# Patient Record
Sex: MALE | Race: WHITE
[De-identification: names, ages, dates, MRNs, and addresses within clinical notes are randomized per-mention and may not be internally consistent; named-entity substitution may affect disease eponyms.]

---

## 2017-07-28 NOTE — ERA
ER Documentation


Chief Complaint


Date/Time


DATE: 7/28/17 


TIME: 14:34


Chief Complaint


POSSIBLE ALLERGIC REACTION TO R HAND





HPI


This is an otherwise healthy 31-year-old male presenting with a chief complaint 

of rash 2 weeks.  Patient states that the rash is pruritic and on his hands 

have come and gone.  Patient has tried Clotrimazole with no relief.  Patient 

also has had a chronic rash on his ankles and under the armpits.  Denies fever, 

rapid progression of symptoms, recent antibiotic use, symptomatic close contacts

, diabetes, pain, history of skin opening or by. Patients vaccination status is 

up to date.  No recent travel.  Nursing notes have been reviewed and are 

consistent with history given.





ROS


All systems reviewed and are negative except as per history of present illness.





Medications


Home Meds


Active Scripts


Hydrocortisone* Topical (Hydrocortisone* Topical) 2.5%-28.3 Gm Cream..g., 1 

APPLIC TOP BID, #1 TUB


   Prov:LOW BALLESTEROS PA-C         7/28/17


Prednisone* (Prednisone*) 20 Mg Tab, 40 MG PO DAILY for 4 Days, TAB


   Prov:LOW BALLESTEROS PA-C         7/28/17





PMhx/Soc


History of Surgery:  No


Anesthesia Reaction:  No


Hx Neurological Disorder:  No


Hx Respiratory Disorders:  No


Hx Cardiac Disorders:  No


Hx Psychiatric Problems:  No


Hx Miscellaneous Medical Probl:  No


Hx Alcohol Use:  No


Hx Substance Use:  No


Hx Tobacco Use:  No


Smoking Status:  Never smoker





Physical Exam


Vitals





Vital Signs








  Date Time  Temp Pulse Resp B/P Pulse Ox O2 Delivery O2 Flow Rate FiO2


 


7/28/17 14:02 98.0 80 18 141/77 0   








Physical Exam


Const:          Healthy-appearing. Well-nourished. Well-developed. No acute 

distress.


Skin:          Pink wheals on the hands bilaterally with mild swelling.  

Confide rash on the posterior ankles around the Achilles tendon bilaterally 

most consistent with psoriasis.


Ext:          No palpable cord. Normal movement of all extremities grossly 

observed.


Neur:          Awake, alert and oriented x3. Neurovascularly intact 

bilaterally. 


Oral:          No oral edema visualized.  Mucous membranes moist and pink.


Head:          Normocephalic, Atraumatic. 


Eyes:          Non-injected; No discharge. EOMI and AUDREY bilaterally.


Ears:          Normal External Ears, EACs clear, TM normal bilaterally without 

erythema. 


Nose:          Normal external nose; no discharge, or sinus tenderness.  


Neck:          No cervical lymphadenopathy, masses or goiter palpated. ~ No 

meningismus.


Pulm:          Good air movement in upper and lower respiratory tracts. No 

dyspnea, stridor, tripoding or drooling. Clear to auscultation bilaterally.


Cardio:          Regular rate and rhythm; No murmurs, gallops or rubs 

auscultated. No JVD grossly observed. No cyanosis. Capillary refill less than 2 

seconds. 


Abd:          Soft, non tender, non distended. No guarding, masses. Normal 

bowel sounds.


MS:          Normal motor strength, normal tone with gross examination.


Back:          No midline, flank or CVA tenderness.


Psych:          Normal Mood and Affect.





Procedures/MDM


31-year-old otherwise healthy male presenting with a chief complaint of rash 2 

weeks.  Patient has an unknown trigger but symptoms and signs are most 

consistent with allergic reaction versus contact dermatitis.  Patient also has 

another chronic rash on the ankles is most consistent with psoriasis.  At this 

time of little suspicion for fungal or bacterial involvement as the patient has 

tried clotrimazole without improvement.  I also have little suspicion for 

endangerment of the airway or cardiac involvement.  Will go ahead and prescribe 

the patient oral prednisone 4 days and topical hydrocortisone.  I have spoke 

with the patient regarding their condition and future management. They have 

verbally responded that they understand their status and treatment plan. The 

patients vitals are stable, and their current condition is appropriate for 

discharge. The patient will be given discharge instructions with return 

precautions.





Departure


Diagnosis:  


 Primary Impression:  


 Allergic reaction


 Qualified Code:  T78.40XA - Allergic reaction, initial encounter


 Additional Impression:  


 Contact dermatitis


 Qualified Code:  L25.9 - Contact dermatitis, unspecified contact dermatitis 

type, unspecified trigger


Condition:  Stable


Patient Instructions:  Contact Dermatitis





Additional Instructions:  


Enrique un seguimiento con [facility] dentro de los prximos 1-3 neal para anya evaluaci

n ms completa y anya posible derivacin a un especialista. Devuelva el 

departamento de emergencia inmediatamente si los sntomas empeoran o cambian. 

Si tiene alguna pregunta con respecto a los medicamentos, consulte con [facility]

utico o con nosotros antes de salir. Si se producen reacciones adversas 

mientras [place] [facility] medicamentos, suspenda el tratamiento y regrese 

inmediatamente al servicio de urgencias. [place] medicamentos segn las 

indicaciones y complete el curso completo del tratamiento.











LOW BALLESTEROS PA-C Jul 28, 2017 14:39

## 2018-06-03 ENCOUNTER — HOSPITAL ENCOUNTER (EMERGENCY)
Age: 33
Discharge: HOME | End: 2018-06-03

## 2018-06-03 ENCOUNTER — HOSPITAL ENCOUNTER (EMERGENCY)
Dept: HOSPITAL 91 - FTE | Age: 33
Discharge: HOME | End: 2018-06-03
Payer: MEDICAID

## 2018-06-03 DIAGNOSIS — K04.7: Primary | ICD-10-CM

## 2018-06-03 PROCEDURE — 41800 DRAINAGE OF GUM LESION: CPT

## 2018-06-03 PROCEDURE — 99284 EMERGENCY DEPT VISIT MOD MDM: CPT

## 2018-06-03 PROCEDURE — 96372 THER/PROPH/DIAG INJ SC/IM: CPT

## 2018-06-03 RX ADMIN — LIDOCAINE HYDROCHLORIDE 1 ML: 10 INJECTION, SOLUTION INFILTRATION; PERINEURAL at 17:24

## 2018-06-03 RX ADMIN — KETOROLAC TROMETHAMINE 1 MG: 30 INJECTION, SOLUTION INTRAMUSCULAR at 17:23

## 2018-06-03 RX ADMIN — HYDROCODONE BITARTRATE AND ACETAMINOPHEN 1 TAB: 5; 325 TABLET ORAL at 17:12

## 2018-10-05 ENCOUNTER — HOSPITAL ENCOUNTER (EMERGENCY)
Dept: HOSPITAL 91 - FTE | Age: 33
Discharge: HOME | End: 2018-10-05
Payer: SELF-PAY

## 2018-10-05 ENCOUNTER — HOSPITAL ENCOUNTER (EMERGENCY)
Age: 33
Discharge: HOME | End: 2018-10-05

## 2018-10-05 DIAGNOSIS — S82.892A: Primary | ICD-10-CM

## 2018-10-05 DIAGNOSIS — S90.511A: ICD-10-CM
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
## 2018-10-05 DIAGNOSIS — S90.01XA: ICD-10-CM

## 2018-10-05 DIAGNOSIS — S62.102A: ICD-10-CM
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
## 2018-10-05 PROCEDURE — 99283 EMERGENCY DEPT VISIT LOW MDM: CPT

## 2018-10-05 PROCEDURE — 73610 X-RAY EXAM OF ANKLE: CPT

## 2018-10-05 RX ADMIN — HYDROCODONE BITARTRATE AND ACETAMINOPHEN 1 TAB: 5; 325 TABLET ORAL at 13:09

## 2019-02-16 ENCOUNTER — HOSPITAL ENCOUNTER (INPATIENT)
Dept: HOSPITAL 10 - FTE | Age: 34
LOS: 1 days | Discharge: LEFT BEFORE DISCHARGE | DRG: 540 | End: 2019-02-17
Payer: MEDICAID

## 2019-02-16 ENCOUNTER — HOSPITAL ENCOUNTER (INPATIENT)
Dept: HOSPITAL 91 - FTE | Age: 34
LOS: 1 days | Discharge: LEFT BEFORE DISCHARGE | DRG: 540 | End: 2019-02-17
Payer: MEDICAID

## 2019-02-16 VITALS
HEIGHT: 69 IN | HEIGHT: 69 IN | BODY MASS INDEX: 29.32 KG/M2 | WEIGHT: 197.98 LBS | WEIGHT: 197.98 LBS | BODY MASS INDEX: 29.32 KG/M2

## 2019-02-16 VITALS — RESPIRATION RATE: 18 BRPM | DIASTOLIC BLOOD PRESSURE: 84 MMHG | HEART RATE: 85 BPM | SYSTOLIC BLOOD PRESSURE: 134 MMHG

## 2019-02-16 VITALS — HEART RATE: 89 BPM | SYSTOLIC BLOOD PRESSURE: 130 MMHG | RESPIRATION RATE: 16 BRPM | DIASTOLIC BLOOD PRESSURE: 68 MMHG

## 2019-02-16 VITALS — HEART RATE: 101 BPM | SYSTOLIC BLOOD PRESSURE: 130 MMHG | DIASTOLIC BLOOD PRESSURE: 77 MMHG | RESPIRATION RATE: 18 BRPM

## 2019-02-16 DIAGNOSIS — M86.8X3: Primary | ICD-10-CM

## 2019-02-16 DIAGNOSIS — L03.114: ICD-10-CM

## 2019-02-16 DIAGNOSIS — M25.422: ICD-10-CM

## 2019-02-16 LAB
ADD MAN DIFF?: NO
ALANINE AMINOTRANSFERASE: 26 IU/L (ref 13–69)
ALBUMIN/GLOBULIN RATIO: 1.11
ALBUMIN: 5 G/DL (ref 3.3–4.9)
ALKALINE PHOSPHATASE: 105 IU/L (ref 42–121)
ANION GAP: 16 (ref 5–13)
ASPARTATE AMINO TRANSFERASE: 31 IU/L (ref 15–46)
BASOPHIL #: 0 10^3/UL (ref 0–0.1)
BASOPHILS %: 0.4 % (ref 0–2)
BILIRUBIN,DIRECT: 0 MG/DL (ref 0–0.2)
BILIRUBIN,TOTAL: 0.3 MG/DL (ref 0.2–1.3)
BLOOD UREA NITROGEN: 13 MG/DL (ref 7–20)
C-REACTIVE PROTEIN: < 0.5 MG/DL (ref 0–0.9)
CALCIUM: 10 MG/DL (ref 8.4–10.2)
CARBON DIOXIDE: 28 MMOL/L (ref 21–31)
CHLORIDE: 98 MMOL/L (ref 97–110)
CREATININE: 0.89 MG/DL (ref 0.61–1.24)
EOSINOPHILS #: 0.1 10^3/UL (ref 0–0.5)
EOSINOPHILS %: 0.8 % (ref 0–7)
ERYTHROCYTE SEDIMENTATION RATE: 15 MM/HR (ref 0–15)
GLOBULIN: 4.5 G/DL (ref 1.3–3.2)
GLUCOSE: 94 MG/DL (ref 70–220)
HEMATOCRIT: 49.5 % (ref 42–52)
HEMOGLOBIN: 16.7 G/DL (ref 14–18)
IMMATURE GRANS #M: 0.04 10^3/UL (ref 0–0.03)
IMMATURE GRANS % (M): 0.4 % (ref 0–0.43)
LYMPHOCYTES #: 3.8 10^3/UL (ref 0.8–2.9)
LYMPHOCYTES %: 41.1 % (ref 15–51)
MEAN CORPUSCULAR HEMOGLOBIN: 28.4 PG (ref 29–33)
MEAN CORPUSCULAR HGB CONC: 33.7 G/DL (ref 32–37)
MEAN CORPUSCULAR VOLUME: 84.3 FL (ref 82–101)
MEAN PLATELET VOLUME: 10.7 FL (ref 7.4–10.4)
MONOCYTE #: 0.8 10^3/UL (ref 0.3–0.9)
MONOCYTES %: 8.8 % (ref 0–11)
NEUTROPHIL #: 4.5 10^3/UL (ref 1.6–7.5)
NEUTROPHILS %: 48.5 % (ref 39–77)
NUCLEATED RED BLOOD CELLS #: 0 10^3/UL (ref 0–0)
NUCLEATED RED BLOOD CELLS%: 0 /100WBC (ref 0–0)
PLATELET COUNT: 293 10^3/UL (ref 140–415)
POTASSIUM: 4.1 MMOL/L (ref 3.5–5.1)
RED BLOOD COUNT: 5.87 10^6/UL (ref 4.7–6.1)
RED CELL DISTRIBUTION WIDTH: 13.5 % (ref 11.5–14.5)
SODIUM: 142 MMOL/L (ref 135–144)
TOTAL PROTEIN: 9.5 G/DL (ref 6.1–8.1)
WHITE BLOOD COUNT: 9.2 10^3/UL (ref 4.8–10.8)

## 2019-02-16 PROCEDURE — 83605 ASSAY OF LACTIC ACID: CPT

## 2019-02-16 PROCEDURE — 93005 ELECTROCARDIOGRAM TRACING: CPT

## 2019-02-16 PROCEDURE — 87040 BLOOD CULTURE FOR BACTERIA: CPT

## 2019-02-16 PROCEDURE — 83036 HEMOGLOBIN GLYCOSYLATED A1C: CPT

## 2019-02-16 PROCEDURE — 96365 THER/PROPH/DIAG IV INF INIT: CPT

## 2019-02-16 PROCEDURE — 80202 ASSAY OF VANCOMYCIN: CPT

## 2019-02-16 PROCEDURE — 87070 CULTURE OTHR SPECIMN AEROBIC: CPT

## 2019-02-16 PROCEDURE — 86140 C-REACTIVE PROTEIN: CPT

## 2019-02-16 PROCEDURE — 84145 PROCALCITONIN (PCT): CPT

## 2019-02-16 PROCEDURE — 73221 MRI JOINT UPR EXTREM W/O DYE: CPT

## 2019-02-16 PROCEDURE — 36415 COLL VENOUS BLD VENIPUNCTURE: CPT

## 2019-02-16 PROCEDURE — 96375 TX/PRO/DX INJ NEW DRUG ADDON: CPT

## 2019-02-16 PROCEDURE — 84443 ASSAY THYROID STIM HORMONE: CPT

## 2019-02-16 PROCEDURE — 85025 COMPLETE CBC W/AUTO DIFF WBC: CPT

## 2019-02-16 PROCEDURE — 80053 COMPREHEN METABOLIC PANEL: CPT

## 2019-02-16 PROCEDURE — 73200 CT UPPER EXTREMITY W/O DYE: CPT

## 2019-02-16 PROCEDURE — 73090 X-RAY EXAM OF FOREARM: CPT

## 2019-02-16 PROCEDURE — 85651 RBC SED RATE NONAUTOMATED: CPT

## 2019-02-16 PROCEDURE — 96367 TX/PROPH/DG ADDL SEQ IV INF: CPT

## 2019-02-16 PROCEDURE — 99285 EMERGENCY DEPT VISIT HI MDM: CPT

## 2019-02-16 RX ADMIN — THIAMINE HYDROCHLORIDE 1 MLS/HR: 100 INJECTION, SOLUTION INTRAMUSCULAR; INTRAVENOUS at 13:57

## 2019-02-16 RX ADMIN — VANCOMYCIN HYDROCHLORIDE 1 MLS/HR: 1 INJECTION, POWDER, LYOPHILIZED, FOR SOLUTION INTRAVENOUS at 14:29

## 2019-02-16 RX ADMIN — VANCOMYCIN HYDROCHLORIDE 1 MLS/HR: 1 INJECTION, POWDER, LYOPHILIZED, FOR SOLUTION INTRAVENOUS at 22:20

## 2019-02-16 RX ADMIN — PIPERACILLIN SODIUM AND TAZOBACTAM SODIUM 1 MLS/HR: 3; .375 INJECTION, POWDER, LYOPHILIZED, FOR SOLUTION INTRAVENOUS at 20:50

## 2019-02-16 RX ADMIN — FOLIC ACID SCH MLS/HR: 5 INJECTION, SOLUTION INTRAMUSCULAR; INTRAVENOUS; SUBCUTANEOUS at 18:39

## 2019-02-16 RX ADMIN — PIPERACILLIN SODIUM AND TAZOBACTAM SODIUM SCH MLS/HR: 3; .375 INJECTION, POWDER, LYOPHILIZED, FOR SOLUTION INTRAVENOUS at 23:36

## 2019-02-16 RX ADMIN — ONDANSETRON HYDROCHLORIDE 1 MG: 2 INJECTION, SOLUTION INTRAMUSCULAR; INTRAVENOUS at 15:16

## 2019-02-16 RX ADMIN — LIDOCAINE HYDROCHLORIDE 1 ML: 10 INJECTION, SOLUTION EPIDURAL; INFILTRATION; INTRACAUDAL; PERINEURAL at 10:27

## 2019-02-16 RX ADMIN — PIPERACILLIN SODIUM AND TAZOBACTAM SODIUM 1 MLS/HR: 3; .375 INJECTION, POWDER, LYOPHILIZED, FOR SOLUTION INTRAVENOUS at 23:36

## 2019-02-16 RX ADMIN — PIPERACILLIN SODIUM AND TAZOBACTAM SODIUM SCH MLS/HR: 3; .375 INJECTION, POWDER, LYOPHILIZED, FOR SOLUTION INTRAVENOUS at 20:50

## 2019-02-16 RX ADMIN — VANCOMYCIN HYDROCHLORIDE SCH MLS/HR: 1 INJECTION, POWDER, LYOPHILIZED, FOR SOLUTION INTRAVENOUS at 22:20

## 2019-02-16 RX ADMIN — CEFTRIAXONE 1 MLS/HR: 1 INJECTION, SOLUTION INTRAVENOUS at 13:56

## 2019-02-16 RX ADMIN — THIAMINE HYDROCHLORIDE 1 MLS/HR: 100 INJECTION, SOLUTION INTRAMUSCULAR; INTRAVENOUS at 18:39

## 2019-02-16 NOTE — ERD
ER Documentation


Chief Complaint


Chief Complaint





lt wrist pain





HPI


33-year-old male presents for left forearm pain times 3 weeks.  Patient states 


that he had a fall few weeks ago when he had left wrist and left ankle fracture 


which was casted.  He subsequently developed left forearm swelling which was 


thought to be due to use of crutches.  He however states that the swelling has 


not gone away.  The left forearm is developing some erythema.  Denies any fevers


or chills.  He states he has 6 out of 10 pain.





ROS


All systems reviewed and are negative except as per history of present illness.





Medications


Home Meds


Active Scripts


Ibuprofen* (Motrin*) 600 Mg Tab, 600 MG PO Q6, #30 TAB


   Prov:DILLON SHARMA PA-C         10/5/18


Naproxen* (Naprosyn*) 500 Mg Tablet, 500 MG PO BID PRN for PAIN AND/OR 


INFLAMMATION, #30 TAB


   Prov:MURALI ROUSSEAU PA-C         6/3/18


Hydrocodone/Acetaminophen (Norco 5-325 Tablet) 1 Each Tablet, 1 TAB PO Q6H PRN 


for PAIN, #7 TAB


   Prov:MURALI ROUSSEAU PA-C         6/3/18


Amoxicillin/Potassium Clav (Amox-Clav 875-125 mg Tablet) 875-125 mg Tab, 1 TAB 


PO BID for 10 Days, #20 TAB


   Prov:MURALI ROUSSEAU PA-C         6/3/18


Hydrocortisone* Topical (Hydrocortisone* Topical) 2.5%-28.3 Gm Cream..g., 1 


APPLIC TOP BID, #1 TUB


   Prov:OLW BALLESTEROS PA-C         7/28/17


Prednisone* (Prednisone*) 20 Mg Tab, 40 MG PO DAILY for 4 Days, TAB


   Prov:LOW BALLESTEROS PA-C         7/28/17





Allergies


Allergies:  


Coded Allergies:  


     No Known Allergy (Unverified , 2/16/19)





PMhx/Soc


Medical and Surgical Hx:  pt denies Medical Hx, pt denies Surgical Hx


History of Surgery:  No


Anesthesia Reaction:  No


Hx Neurological Disorder:  No


Hx Respiratory Disorders:  No


Hx Cardiac Disorders:  No


Hx Psychiatric Problems:  No


Hx Miscellaneous Medical Probl:  No


Hx Alcohol Use:  Yes (occassional)


Hx Substance Use:  No


Hx Tobacco Use:  No


Smoking Status:  Former smoker





Physical Exam


Vitals





Vital Signs


  Date      Temp  Pulse  Resp  B/P (MAP)   Pulse Ox  O2          O2 Flow    FiO2


Time                                                 Delivery    Rate


   2/16/19  98.3     83    18      167/96        98


     09:31                          (119)





Physical Exam


Const:   No acute distress


Neck:               Full range of motion. No meningismus.


Resp:   Clear to auscultation bilaterally


Cardio:   Regular rate and rhythm, no murmurs, cap refills less than 2 seconds 


in all fingers of left hand


Abd:    Soft, non tender, non distended. Normal bowel sounds


Skin:   No petechiae or rashes


Back:   No midline or flank tenderness


Ext:    Left forearm mass about 2cm with erythema and underlying fluctuance 


Neur:   Awake and alert, lateral upper extremity sensation intact, left hand 


sensation intact


Psych:    Normal Mood and Affect


Result Diagram:  


2/16/19 1334                                                                    


           2/16/19 1334





Results 24 hrs





Laboratory Tests


              Test
                                 2/16/19
13:34


              White Blood Count                      9.2 10^3/ul


              Red Blood Count                       5.87 10^6/ul


              Hemoglobin                               16.7 g/dl


              Hematocrit                                  49.5 %


              Mean Corpuscular Volume                    84.3 fl


              Mean Corpuscular Hemoglobin                28.4 pg


              Mean Corpuscular Hemoglobin
Concent     33.7 g/dl 



              Red Cell Distribution Width                 13.5 %


              Platelet Count                         293 10^3/UL


              Mean Platelet Volume                       10.7 fl


              Immature Granulocytes %                    0.400 %


              Neutrophils %                               48.5 %


              Lymphocytes %                               41.1 %


              Monocytes %                                  8.8 %


              Eosinophils %                                0.8 %


              Basophils %                                  0.4 %


              Nucleated Red Blood Cells %            0.0 /100WBC


              Immature Granulocytes #              0.040 10^3/ul


              Neutrophils #                          4.5 10^3/ul


              Lymphocytes #                          3.8 10^3/ul


              Monocytes #                            0.8 10^3/ul


              Eosinophils #                          0.1 10^3/ul


              Basophils #                            0.0 10^3/ul


              Nucleated Red Blood Cells #            0.0 10^3/ul


              Sodium Level                            142 mmol/L


              Potassium Level                         4.1 mmol/L


              Chloride Level                           98 mmol/L


              Carbon Dioxide Level                     28 mmol/L


              Anion Gap                                       16


              Blood Urea Nitrogen                       13 mg/dl


              Creatinine                              0.89 mg/dl


              Est Glomerular Filtrat Rate
mL/min   > 60 mL/min 



              Glucose Level                             94 mg/dl


              Calcium Level                           10.0 mg/dl


              Total Bilirubin                          0.3 mg/dl


              Direct Bilirubin                        0.00 mg/dl


              Indirect Bilirubin                       0.3 mg/dl


              Aspartate Amino Transf
(AST/SGOT)         31 IU/L 



              Alanine Aminotransferase
(ALT/SGPT)       26 IU/L 



              Alkaline Phosphatase                      105 IU/L


              C-Reactive Protein                   < 0.5 mg/dl


              Total Protein                             9.5 g/dl


              Albumin                                   5.0 g/dl


              Globulin                                 4.50 g/dl


              Albumin/Globulin Ratio                        1.11





Current Medications


 Medications
   Dose
          Sig/Marcell
       Start Time
   Status  Last


 (Trade)       Ordered        Route
 PRN     Stop Time              Admin
Dose


                              Reason                                Admin


 Lidocaine
     5 ml           ONCE  ONCE
    2/16/19       DC       



(Xylocaine                    INFIL
         10:30



1%
  (Mpf))                                  2/16/19 10:31


 Ceftriaxone    50 ml @ 
      ONCE  ONCE
    2/16/19       DC           2/16/19


Sodium         100 mls/hr     IVPB
          13:30
                       13:56



                                             2/16/19 13:59


 Vancomycin     250 ml @ 
     ONCE  ONCE
    2/16/19       DC           2/16/19


HCl            125 mls/hr     IVPB
          13:30
                       14:29



                                             2/16/19 15:29


 Sodium         1,000 ml @ 
   Q1H ONCE
      2/16/19       DC           2/16/19


Chloride       1,000 mls/hr   IV
            13:30
                       13:57



                                             2/16/19 14:29


 Morphine       4 mg           ONCE  STAT
    2/16/19       DC           2/16/19


Sulfate
                      IV
            14:40
                       15:17



(morphine)                                   2/16/19 14:41


 Ondansetron    4 mg           ONCE  STAT
    2/16/19       DC           2/16/19


HCl
  (Zofran                 IV
            14:40
                       15:16



Inj)                                         2/16/19 14:41


 Ondansetron    4 mg           BRIDGE ORDER   2/16/19                



HCl
  (Zofran                 PRN
 IV
       15:30



Inj)                          NAUSEA/VOMITI  2/17/19 15:29


                              NG


                650 mg         ER BRIDGE      2/16/19                



Acetaminophen                 PRN
 PO
       15:30




  (Tylenol                   .MILD PAIN     2/17/19 15:29


Tab)                          1-3 OR TEMP








Procedures/MDM


Abscess Incision and Drainage with irrigation by me:


Location:      Left forearm


Anesthesia:                               Local 1% Lidocaine


Technique:      Irrigated. Disrupted loculations w/ instrumentation


Packing:      None


Complications:      Neurovascularly intact post procedure








Medical Decision Making:





Differential diagnosis includes but not limited to left forearm abscess, 


osteomyelitis,contact dermatitis





Patient appeared well on physical exam.


Left forearm examination consistent with an abscess.


Due to the size of the infection x-ray was ordered.





The left forearm x-ray showed possibility of a bone infection versus abscess 


versus tumor


CT of the forearm showed chronic osteomyelitis with radiocarpal joint effusion





CBC:      no e/o of systemic infection or severe anemia


CMP:      no e/o severe acidosis, alkalosis, renal failure, diabetic 


ketoacidosis, liver disease





Blood cultures were ordered





The abscess was incised and drained, see procedure note above.  Wound culture of


the abscess was sent to lab.





Patient was started on IV fluids, pain medication and IV Rocephin and vancomycin


for the osteomyelitis.





Patient did not meet SIRS criteria, low suspicion for sepsis.





Given need for IV antibiotics for osteomyelitis, contacted on call hospitalist 


team. 


Dr. Huber agreed to admit patient for further management.








Disclaimer: Inadvertent spelling and grammatical errors are likely due to 


EHR/dictation software use and do not reflect on the overall quality of patient 


care. Also, please note that the electronic time recorded on this note does not 


necessarily reflect the actual time of the patient encounter.





Departure


Diagnosis:  


   Primary Impression:  


   Osteomyelitis


   Osteomyelitis type:  unspecified type  Osteomyelitis location:  radius  


   Laterality:  left  Qualified Codes:  M86.9 - Osteomyelitis, unspecified


   Additional Impression:  


   Skin abscess


   Site of cutaneous abscess:  extremity  Site of cutaneous abscess of 


   extremity:  upper extremity  Laterality:  left  Qualified Codes:  L02.414 - 


   Cutaneous abscess of left upper limb


Condition:  Serious











PRIYANKLOW                  Feb 16, 2019 14:27

## 2019-02-16 NOTE — HP
Date/Time of Note


Date/Time of Note


DATE: 2/16/19 


TIME: 18:34





Assessment/Plan


VTE Prophylaxis


SCD contraindicated:  low risk/ambulating


Pharmacological prophylaxis:  NA/contraindicated


Pharm contraindication:  surgical contra





Lines/Catheters


IV Catheter Type (from Nrsg):  Saline Lock


Urinary Cath still in place:  No





Assessment/Plan


Hospital Course





CC: Lt wrist infection





[place]


fell off roof 4 months ago. fracture fixed & hardware removed 2 months ago at 


[facility]/ [facility]. still using crutches, area became red? asked to take motrin. over the


last few days, worse. yesterday- pus. no generalized symptoms.





PMH


Lt Rad fracture- not compound





PSH


Lt Radius surgery





SH


no substances





FH


Dm; no CAD, cancer, stroke





ROS


n-no syncope loss of speech vision


c-no chest pain dyspnea edema


p-no cough no wheezing no fever


gi-no pain nausea vomiting or diarrhea


gu-no dysuria hematuria fever


ms + left arm pain swelling no peripheral edema or itching


endo-no diabetes thyroid dysfunction or dyslipidemia


psy-has a stable mood without significant agitation anxiety depression


h-no hematochezia melena hematuria


con-fevers no chills or weight loss that I am aware of





PE


no pallor/adenopathy


reg s1s2 no mrg


ctab


bs+ nt nd; no rrr


no edema; lt wrist- dimish rom; no adenopathy








A/P


1) Lt wrist cellulitis; possible abscess. stable consult ortho. may need 


plastic/ hand surgery teritary referral


Result Diagram:  


2/16/19 1334                                                                    


           2/16/19 1334





Results 24hrs





Laboratory Tests


Test
                                2/16/19
13:34  2/16/19
15:45  2/16/19
15:53


White Blood Count                            9.2


Red Blood Count                             5.87


Hemoglobin                                  16.7


Hematocrit                                  49.5


Mean Corpuscular Volume                     84.3


Mean Corpuscular Hemoglobin                28.4  L


Mean Corpuscular Hemoglobin
Concent        33.7  
  
              



Red Cell Distribution Width                 13.5


Platelet Count                               293


Mean Platelet Volume                       10.7  H


Immature Granulocytes %                    0.400


Neutrophils %                               48.5


Lymphocytes %                               41.1


Monocytes %                                  8.8


Eosinophils %                                0.8


Basophils %                                  0.4


Nucleated Red Blood Cells %                  0.0


Immature Granulocytes #                   0.040  H


Neutrophils #                                4.5


Lymphocytes #                               3.8  H


Monocytes #                                  0.8


Eosinophils #                                0.1


Basophils #                                  0.0


Nucleated Red Blood Cells #                  0.0


Erythrocyte Sedimentation Rate                15


Sodium Level                                 142


Potassium Level                              4.1


Chloride Level                                98


Carbon Dioxide Level                          28


Anion Gap                                    16  H


Blood Urea Nitrogen                           13


Creatinine                                  0.89


Est Glomerular Filtrat Rate
mL/min   > 60  
        
              



Glucose Level                                 94


Calcium Level                               10.0


Total Bilirubin                              0.3


Direct Bilirubin                            0.00


Indirect Bilirubin                           0.3


Aspartate Amino Transf
(AST/SGOT)            31  
  
              



Alanine Aminotransferase
(ALT/SGPT)          26  
  
              



Alkaline Phosphatase                         105


C-Reactive Protein                   < 0.5


Total Protein                               9.5  H


Albumin                                     5.0  H


Globulin                                   4.50  H


Albumin/Globulin Ratio                      1.11


POC Venous Lactate                                         0.3  L          1.5








HPI/ROS


Admit Date/Time


Admit Date/Time


Feb 16, 2019 at 15:28





PMH/Family/Social


Past Medical History


Medications





Current Medications


Sodium Chloride 1,000 ml @  100 mls/hr Q10H IV ;  Start 2/16/19 at 17:00


IV Flush (NS 3 ml) 3 ml PER PROTOCOL IV ;  Start 2/16/19 at 17:00


Ondansetron HCl (Zofran Inj) 4 mg Q6H  PRN IV NAUSEA/VOMITING;  Start 2/16/19 at


17:00


Acetaminophen (Tylenol Tab) 650 mg Q6H  PRN PO .PAIN 1-3 OR TEMP;  Start 2/16/19


at 17:00


Morphine Sulfate (morphine) 2 mg Q4H  PRN IV .SEVERE PAIN 7-10;  Start 2/16/19 


at 17:00


Docusate Sodium (Colace) 100 mg Q12H  PRN PO .CONSTIPATION;  Start 2/16/19 at 


17:00


Magnesium Hydroxide (Milk Of Mag) 30 ml DAILY  PRN PO .CONSTIPATION;  Start 


2/16/19 at 17:00


Bisacodyl (Dulcolax) 5 mg DAILY  PRN PO .CONSTIPATION;  Start 2/16/19 at 17:00


Enoxaparin Sodium (Lovenox) 40 mg DAILY SC ;  Start 2/17/19 at 09:00


Coded Allergies:  


     No Known Allergy (Unverified , 2/16/19)





Social History


Smoking Status:  Former smoker





Exam/Review of Systems


Vital Signs


Vitals





Vital Signs


  Date      Temp  Pulse  Resp  B/P (MAP)   Pulse Ox  O2          O2 Flow    FiO2


Time                                                 Delivery    Rate


   2/16/19  98.5     85    18      134/84        97


     18:23                          (101)


   2/16/19                                           Room Air


     15:59














ALIZA HUERTAS MD         Feb 16, 2019 18:38

## 2019-02-17 VITALS — HEART RATE: 86 BPM | RESPIRATION RATE: 18 BRPM | DIASTOLIC BLOOD PRESSURE: 94 MMHG | SYSTOLIC BLOOD PRESSURE: 159 MMHG

## 2019-02-17 VITALS — HEART RATE: 79 BPM | RESPIRATION RATE: 16 BRPM | DIASTOLIC BLOOD PRESSURE: 55 MMHG | SYSTOLIC BLOOD PRESSURE: 104 MMHG

## 2019-02-17 VITALS — SYSTOLIC BLOOD PRESSURE: 120 MMHG | DIASTOLIC BLOOD PRESSURE: 67 MMHG | HEART RATE: 72 BPM | RESPIRATION RATE: 20 BRPM

## 2019-02-17 VITALS — RESPIRATION RATE: 20 BRPM | HEART RATE: 69 BPM | SYSTOLIC BLOOD PRESSURE: 118 MMHG | DIASTOLIC BLOOD PRESSURE: 70 MMHG

## 2019-02-17 LAB
ADD MAN DIFF?: NO
ALANINE AMINOTRANSFERASE: 28 IU/L (ref 13–69)
ALBUMIN/GLOBULIN RATIO: 1.05
ALBUMIN: 3.6 G/DL (ref 3.3–4.9)
ALKALINE PHOSPHATASE: 81 IU/L (ref 42–121)
ANION GAP: 12 (ref 5–13)
ASPARTATE AMINO TRANSFERASE: 19 IU/L (ref 15–46)
BASOPHIL #: 0 10^3/UL (ref 0–0.1)
BASOPHILS %: 0.3 % (ref 0–2)
BILIRUBIN,DIRECT: 0 MG/DL (ref 0–0.2)
BILIRUBIN,TOTAL: 0.1 MG/DL (ref 0.2–1.3)
BLOOD UREA NITROGEN: 15 MG/DL (ref 7–20)
CALCIUM: 8.8 MG/DL (ref 8.4–10.2)
CARBON DIOXIDE: 25 MMOL/L (ref 21–31)
CHLORIDE: 104 MMOL/L (ref 97–110)
CREATININE: 0.94 MG/DL (ref 0.61–1.24)
EOSINOPHILS #: 0.1 10^3/UL (ref 0–0.5)
EOSINOPHILS %: 1.1 % (ref 0–7)
GLOBULIN: 3.4 G/DL (ref 1.3–3.2)
GLUCOSE: 104 MG/DL (ref 70–220)
HEMATOCRIT: 40.8 % (ref 42–52)
HEMOGLOBIN A1C: 5.5 % (ref 0–5.9)
HEMOGLOBIN: 13.4 G/DL (ref 14–18)
IMMATURE GRANS #M: 0.02 10^3/UL (ref 0–0.03)
IMMATURE GRANS % (M): 0.3 % (ref 0–0.43)
LYMPHOCYTES #: 2.4 10^3/UL (ref 0.8–2.9)
LYMPHOCYTES %: 38.6 % (ref 15–51)
MEAN CORPUSCULAR HEMOGLOBIN: 28.5 PG (ref 29–33)
MEAN CORPUSCULAR HGB CONC: 32.8 G/DL (ref 32–37)
MEAN CORPUSCULAR VOLUME: 86.8 FL (ref 82–101)
MEAN PLATELET VOLUME: 11 FL (ref 7.4–10.4)
MONOCYTE #: 0.7 10^3/UL (ref 0.3–0.9)
MONOCYTES %: 11 % (ref 0–11)
NEUTROPHIL #: 3 10^3/UL (ref 1.6–7.5)
NEUTROPHILS %: 48.7 % (ref 39–77)
NUCLEATED RED BLOOD CELLS #: 0 10^3/UL (ref 0–0)
NUCLEATED RED BLOOD CELLS%: 0 /100WBC (ref 0–0)
PLATELET COUNT: 248 10^3/UL (ref 140–415)
POTASSIUM: 4.2 MMOL/L (ref 3.5–5.1)
RED BLOOD COUNT: 4.7 10^6/UL (ref 4.7–6.1)
RED CELL DISTRIBUTION WIDTH: 13.5 % (ref 11.5–14.5)
SODIUM: 141 MMOL/L (ref 135–144)
THYROID STIMULATING HORMONE: 2.49 MIU/L (ref 0.47–4.68)
TOTAL PROTEIN: 7 G/DL (ref 6.1–8.1)
VANCOMYCIN,TROUGH: 10.4 UG/ML (ref 10–20)
WHITE BLOOD COUNT: 6.2 10^3/UL (ref 4.8–10.8)

## 2019-02-17 RX ADMIN — PIPERACILLIN SODIUM AND TAZOBACTAM SODIUM 1 MLS/HR: 3; .375 INJECTION, POWDER, LYOPHILIZED, FOR SOLUTION INTRAVENOUS at 05:02

## 2019-02-17 RX ADMIN — THIAMINE HYDROCHLORIDE 1 MLS/HR: 100 INJECTION, SOLUTION INTRAMUSCULAR; INTRAVENOUS at 05:51

## 2019-02-17 RX ADMIN — PIPERACILLIN SODIUM AND TAZOBACTAM SODIUM SCH MLS/HR: 3; .375 INJECTION, POWDER, LYOPHILIZED, FOR SOLUTION INTRAVENOUS at 18:17

## 2019-02-17 RX ADMIN — ENOXAPARIN SODIUM 1 MG: 100 INJECTION SUBCUTANEOUS at 09:15

## 2019-02-17 RX ADMIN — THIAMINE HYDROCHLORIDE 1 MLS/HR: 100 INJECTION, SOLUTION INTRAMUSCULAR; INTRAVENOUS at 11:04

## 2019-02-17 RX ADMIN — PIPERACILLIN SODIUM AND TAZOBACTAM SODIUM 1 MLS/HR: 3; .375 INJECTION, POWDER, LYOPHILIZED, FOR SOLUTION INTRAVENOUS at 12:37

## 2019-02-17 RX ADMIN — PIPERACILLIN SODIUM AND TAZOBACTAM SODIUM SCH MLS/HR: 3; .375 INJECTION, POWDER, LYOPHILIZED, FOR SOLUTION INTRAVENOUS at 12:37

## 2019-02-17 RX ADMIN — FOLIC ACID SCH MLS/HR: 5 INJECTION, SOLUTION INTRAMUSCULAR; INTRAVENOUS; SUBCUTANEOUS at 11:04

## 2019-02-17 RX ADMIN — PIPERACILLIN SODIUM AND TAZOBACTAM SODIUM 1 MLS/HR: 3; .375 INJECTION, POWDER, LYOPHILIZED, FOR SOLUTION INTRAVENOUS at 18:17

## 2019-02-17 RX ADMIN — VANCOMYCIN HYDROCHLORIDE 1 MLS/HR: 1 INJECTION, POWDER, LYOPHILIZED, FOR SOLUTION INTRAVENOUS at 05:40

## 2019-02-17 RX ADMIN — FOLIC ACID SCH MLS/HR: 5 INJECTION, SOLUTION INTRAMUSCULAR; INTRAVENOUS; SUBCUTANEOUS at 05:51

## 2019-02-17 RX ADMIN — PIPERACILLIN SODIUM AND TAZOBACTAM SODIUM SCH MLS/HR: 3; .375 INJECTION, POWDER, LYOPHILIZED, FOR SOLUTION INTRAVENOUS at 05:02

## 2019-02-17 RX ADMIN — VANCOMYCIN HYDROCHLORIDE SCH MLS/HR: 1 INJECTION, POWDER, LYOPHILIZED, FOR SOLUTION INTRAVENOUS at 05:40

## 2019-02-17 RX ADMIN — VANCOMYCIN HYDROCHLORIDE 1 MLS/HR: 1 INJECTION, POWDER, LYOPHILIZED, FOR SOLUTION INTRAVENOUS at 15:23

## 2019-02-17 RX ADMIN — VANCOMYCIN HYDROCHLORIDE SCH MLS/HR: 1 INJECTION, POWDER, LYOPHILIZED, FOR SOLUTION INTRAVENOUS at 15:23

## 2019-02-17 NOTE — PN
Date/Time of Note


Date/Time of Note


DATE: 2/17/19 


TIME: 14:07





Assessment/Plan


VTE Prophylaxis


Risk score (from Ns)>0 risk:  2


SCD applied (from Ns):  Yes


SCD contraindicated:  low risk/ambulating


Pharmacological prophylaxis:  NA/contraindicated


Pharm contraindication:  surgical contra





Lines/Catheters


IV Catheter Type (from [facility]):  Peripheral IV


Urinary Cath still in place:  No





Assessment/Plan


Hospital Course





A/P


1) Lt wrist cellulitis; possible abscess. stable consulted ortho; mri -P. may 


need plastic/ hand surgery teritary referral





Subjective: No distress fever.





O: Vital signs stable





PE


no pallor


reg s1s2 no mrg


ctab


bs+ nt nd; no rrr


no edema; lt wrist- dimish rom


Result Diagram:  


2/17/19 0537 2/17/19 0537





Results 24hrs





Laboratory Tests


Test
                                2/16/19
15:45  2/16/19
15:53  2/17/19
05:37


POC Venous Lactate                          0.3  L          1.5


White Blood Count                                                         6.2  #


Red Blood Count                                                           4.70


Hemoglobin                                                               13.4  L


Hematocrit                                                               40.8  L


Mean Corpuscular Volume                                                   86.8


Mean Corpuscular Hemoglobin                                              28.5  L


Mean Corpuscular Hemoglobin
Concent  
              
                    32.8  



Red Cell Distribution Width                                               13.5


Platelet Count                                                             248


Mean Platelet Volume                                                     11.0  H


Immature Granulocytes %                                                  0.300


Neutrophils %                                                             48.7


Lymphocytes %                                                             38.6


Monocytes %                                                               11.0


Eosinophils %                                                              1.1


Basophils %                                                                0.3


Nucleated Red Blood Cells %                                                0.0


Immature Granulocytes #                                                  0.020


Neutrophils #                                                              3.0


Lymphocytes #                                                              2.4


Monocytes #                                                                0.7


Eosinophils #                                                              0.1


Basophils #                                                                0.0


Nucleated Red Blood Cells #                                                0.0


Sodium Level                                                               141


Potassium Level                                                            4.2


Chloride Level                                                             104


Carbon Dioxide Level                                                        25


Anion Gap                                                                   12


Blood Urea Nitrogen                                                         15


Creatinine                                                                0.94


Est Glomerular Filtrat Rate
mL/min   
              
              > 60  



Glucose Level                                                              104


Hemoglobin A1c                                                             5.5


Calcium Level                                                              8.8


Total Bilirubin                                                           0.1  L


Direct Bilirubin                                                          0.00


Indirect Bilirubin                                                         0.1


Aspartate Amino Transf
(AST/SGOT)    
              
                      19  



Alanine Aminotransferase
(ALT/SGPT)  
              
                      28  



Alkaline Phosphatase                                                        81


Total Protein                                                             7.0  #


Albumin                                                                   3.6  #


Globulin                                                                 3.40  H


Albumin/Globulin Ratio                                                    1.05


Thyroid Stimulating Hormone
(TSH)    
              
                   2.490  









Exam/Review of Systems


Exam


Vitals





Vital Signs


  Date      Temp  Pulse  Resp  B/P (MAP)   Pulse Ox  O2          O2 Flow    FiO2


Time                                                 Delivery    Rate


   2/17/19  98.4     69    20      118/70        98


     07:35                           (86)


   2/16/19                                           Room Air


     15:59








Intake and Output





2/16/19 2/16/19 2/17/19





1515:00


23:00


07:00





IntakeIntake Total


1050 ml


830 ml


1250 ml





OutputOutput Total


500 ml





BalanceBalance


1050 ml


330 ml


1250 ml














Results


Results 24hrs





Laboratory Tests


Test
                                2/16/19
15:45  2/16/19
15:53  2/17/19
05:37


POC Venous Lactate                          0.3  L          1.5


White Blood Count                                                         6.2  #


Red Blood Count                                                           4.70


Hemoglobin                                                               13.4  L


Hematocrit                                                               40.8  L


Mean Corpuscular Volume                                                   86.8


Mean Corpuscular Hemoglobin                                              28.5  L


Mean Corpuscular Hemoglobin
Concent  
              
                    32.8  



Red Cell Distribution Width                                               13.5


Platelet Count                                                             248


Mean Platelet Volume                                                     11.0  H


Immature Granulocytes %                                                  0.300


Neutrophils %                                                             48.7


Lymphocytes %                                                             38.6


Monocytes %                                                               11.0


Eosinophils %                                                              1.1


Basophils %                                                                0.3


Nucleated Red Blood Cells %                                                0.0


Immature Granulocytes #                                                  0.020


Neutrophils #                                                              3.0


Lymphocytes #                                                              2.4


Monocytes #                                                                0.7


Eosinophils #                                                              0.1


Basophils #                                                                0.0


Nucleated Red Blood Cells #                                                0.0


Sodium Level                                                               141


Potassium Level                                                            4.2


Chloride Level                                                             104


Carbon Dioxide Level                                                        25


Anion Gap                                                                   12


Blood Urea Nitrogen                                                         15


Creatinine                                                                0.94


Est Glomerular Filtrat Rate
mL/min   
              
              > 60  



Glucose Level                                                              104


Hemoglobin A1c                                                             5.5


Calcium Level                                                              8.8


Total Bilirubin                                                           0.1  L


Direct Bilirubin                                                          0.00


Indirect Bilirubin                                                         0.1


Aspartate Amino Transf
(AST/SGOT)    
              
                      19  



Alanine Aminotransferase
(ALT/SGPT)  
              
                      28  



Alkaline Phosphatase                                                        81


Total Protein                                                             7.0  #


Albumin                                                                   3.6  #


Globulin                                                                 3.40  H


Albumin/Globulin Ratio                                                    1.05


Thyroid Stimulating Hormone
(TSH)    
              
                   2.490  









Medications


Medication





Current Medications


Sodium Chloride 1,000 ml @  100 mls/hr Q10H IV  Last administered on 2/17/19at 


11:04; Admin Dose 100 MLS/HR;  Start 2/16/19 at 17:00


IV Flush (NS 3 ml) 3 ml PER PROTOCOL IV ;  Start 2/16/19 at 17:00


Ondansetron HCl (Zofran Inj) 4 mg Q6H  PRN IV NAUSEA/VOMITING;  Start 2/16/19 at


17:00


Acetaminophen (Tylenol Tab) 650 mg Q6H  PRN PO .PAIN 1-3 OR TEMP;  Start 2/16/19


at 17:00


Morphine Sulfate (morphine) 2 mg Q4H  PRN IV .SEVERE PAIN 7-10;  Start 2/16/19 


at 17:00


Docusate Sodium (Colace) 100 mg Q12H  PRN PO .CONSTIPATION;  Start 2/16/19 at 


17:00


Magnesium Hydroxide (Milk Of Mag) 30 ml DAILY  PRN PO .CONSTIPATION;  Start 


2/16/19 at 17:00


Bisacodyl (Dulcolax) 5 mg DAILY  PRN PO .CONSTIPATION;  Start 2/16/19 at 17:00


Enoxaparin Sodium (Lovenox) 40 mg DAILY SC  Last administered on 2/17/19at 


09:15; Admin Dose 40 MG;  Start 2/17/19 at 09:00


Vancomycin HCl (Vanco Iv Per Pharmacy) VANCOMYCIN PER PHARMACY PER PROTOCOL XX ;


 Start 2/16/19 at 19:00


Piperacillin Sod/ Tazobactam Sod 100 ml @  200 mls/hr Q6 IVPB  Last administered


on 2/17/19at 12:37; Admin Dose 200 MLS/HR;  Start 2/16/19 at 20:00


Vancomycin HCl 250 ml @  125 mls/hr Q8 IVPB  Last administered on 2/17/19at 


05:40; Admin Dose 125 MLS/HR;  Start 2/16/19 at 22:00


Influenza Virus Vaccine Quadrival (Fluzone) 0.5 ml ONCE ONCE IM* ;  Start 


2/19/19 at 10:00;  Stop 2/19/19 at 10:01











ALIZA HUERTAS MD         Feb 17, 2019 14:08

## 2019-02-18 NOTE — DS
Discharge Summary


Admission/Discharge Info


Admit Date/Time





Discharge Date/Time





Home Meds


No Active Prescriptions or Reported Meds


Primary Care Provider





Pending Labs














GERALD GONZALEZ NP               Feb 18, 2019 10:42

## 2019-02-18 NOTE — DS
Date/Time of Note


Date/Time of Note


DATE: 2/18/19 


TIME: 09:39





Discharge Summary


Admission/Discharge Info


Admit Date/Time


Feb 16, 2019 at 15:28


Discharge Date/Time


Feb 17, 2019 at 21:00


Patient Condition:  Stable


Consults


Dr RUFINO Fonseca


Procedures


XR forearm 


 


CLINICAL INDICATION:   Left forearm swelling


 TECHNIQUE:   AP and lateral views of the left forearm 


 COMPARISON:   None 


 FINDINGS:


Mineralization is intact.


There is a 1.3 x 0.6 cm cortically based oval lucency in the dorsal distal 


radius diaphysis with surrounding sclerosis. There is prominent soft tissue 


swelling in the distal dorsal forearm adjacent to this region with faint 


mineralization. There is also volar sided smooth periosteal reaction. There is 


diffuse soft tissue swelling in the mid and proximal forearm also noted.


Mild





IMPRESSION: 


1.  Cortically based oval lytic lesion in the dorsal distal radius diaphysis 


with surrounding sclerosis and adjacent large soft tissue swelling with faint 


mineralization along with smooth periosteal reaction in the volar cortex, and 


additional mid and proximal forearm soft tissue swelling.


2.  Findings may represent infection/Bob's abscess and adjacent soft tissue 


infection in the right clinical setting. Tumor is not excluded, especially if 


there is no clinical concern for infection. Recommend MRI for further 


evaluation.





CT left forearm  


FINDINGS: 


There is focal chronic dorsal cortical defect /cloaca in the distal diaphysis of


the radius with prominent circumferential periosteal reaction. There is 


medullary lucency in this region as well as presence of osseous sequestrum 


measuring 11 x 3 x 3 mm (CC by AP by TV). There is diffuse adjacent soft tissue 


edema/swelling with internal mineralization, extending superficially to the 


dorsal skin with marked skin thickening in the dorsal distal forearm. There is 


diffuse skin thickening and subcutaneous edema/soft tissue swelling extending 


proximally and distally. Likely radiocarpal joint effusion.


 


IMPRESSION: 


1.  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 


decompression/defect/cloaca containing an 11 mm osseous sequestrum, and with 


dorsal decompression of infection with diffuse significant soft tissue 


inflammation/ phlegmon with mineralization extending superficially to the dorsal


distal forearm skin with prominent skin thickening.


2.  Radiocarpal joint effusion.


3.  MRI may be obtained to evaluate extent of osseous and soft tissue disease.


RPTAT: BBDD


_____________________________________________ 





MRI LT FOREARM


MPRESSION: 


1.  Evidence of osteomyelitis at the distal radius with a marked amount of bone 


marrow infiltration about the distal radial diaphysis partially extending beyond


the field of view and to the radial styloid distally. There is also an osseous 


sequestrum dorsal measuring up to 1 cm along with a sinus tract extending 


dorsally to the cortex and into the subcutaneous soft tissues with an associated


1.0 x 3.5 x 2.0 cm subcutaneous abscess.


2.  Small radiocarpal joint effusion. Early extension of the infection into the 


joint/early septic arthritis is not entirely excluded.


3.  Associated findings of cellulitis about the distal forearm.


Hx of Present Illness





33yr M w lt wrist/ forearm infection


Hospital Course





A/P


1) Lt wrist cellulitis, possible OM. stable consulted ortho; mri noted. may need


plastic/ hand surgery/ teritary referral.  Left AMA.


2) [place] [facility] [place]


Home Meds


No Active Prescriptions or Reported Meds


Primary Care Provider


Care Physician No Primary


Time spent on discharge:  < 30 minutes


Pending Labs





Laboratory Tests


                Test
                              2/17/19
13:37


                Vancomycin Level Trough
  10.4 ug/ml
(10.0-20.0)














ALIZA HUERTAS MD         Feb 18, 2019 09:44

## 2019-02-22 LAB — PROCALCITONIN: <0.1 NG/ML (ref ?–0.1)

## 2021-09-08 NOTE — CONS
DATE OF ADMISSION: 02/16/2019

DATE OF CONSULTATION:  

 

 

 

HISTORY OF PRESENT ILLNESS:  The patient is a 33-year-old right-handed male who was admitted on 02/16
/2019 when he came to the emergency room complaining of painful swelling involving his left wrist and
 forearm.  He obviously had a fracture involving his left wrist and left ankle about 4 months ago fol
lowing a fall.  At that time, he was taken to the [facility] and was treated with the cast im
mobilization of the left ankle and a surgical procedure over his left wrist.  According to the patien
t description, there was _____ open reduction and pin fixation, which was removed 6 weeks postop.  Fo
llowing the surgical procedures in his left wrist, he has been suffering from chronic pain which he w
as attributed to the crutch gait initially; however, he was persistently having pain and discomfort a
nd because of the increasing pain with swelling, he showed up to the emergency room on the day of his
 admission.  According to available records, an I and D of the small abscess was carried out involvin
g the left forearm and initial diagnostic evaluation revealed a possible infection of the bone _____ 
brought this abscess along with the presence of a lytic lesion on the dorsal radial diaphysis suggest
adriana of osteomyelitis.

 

PHYSICAL EXAMINATION:  My examination revealed a 33-year-old male who does not appear to be sick at t
his time.  He is afebrile and there was no leukocytosis.  There was tenderness and swelling over the 
left forearm and wrist.  The MRI scan of the right forearm and wrist, findings of extensive osteomyel
itis involving the left radius.

 

DIAGNOSTIC IMPRESSION:  Osteomyelitis of the left wrist, postoperative, recommendations for managemen
t because of the involvement of the left wrist joint along with the osteomyelitis of the left radius.
  We will need a hand surgeon who is willing to take this patient and there are no hand surgeon avail
able at this time and this patient will need a transfer to the major medical center, probably to [facility] where the initial surgery was done for the higher level of care as soon as possible.

 

 

Dictated By: CANELO ASHER MD

 

IK/NTS

DD:    02/17/2019 20:26:23

DT:    02/18/2019 01:11:45

Conf#: [id number]

DID#:  [id number]

CC: ALIZA HUERTAS MD;*EndCC*
yes